# Patient Record
Sex: FEMALE | Race: WHITE | Employment: UNEMPLOYED | RURAL
[De-identification: names, ages, dates, MRNs, and addresses within clinical notes are randomized per-mention and may not be internally consistent; named-entity substitution may affect disease eponyms.]

---

## 2019-04-12 ENCOUNTER — APPOINTMENT (OUTPATIENT)
Dept: GENERAL RADIOLOGY | Facility: HOSPITAL | Age: 74
End: 2019-04-12
Payer: MEDICARE

## 2019-04-12 ENCOUNTER — HOSPITAL ENCOUNTER (EMERGENCY)
Facility: HOSPITAL | Age: 74
Discharge: HOME | End: 2019-04-12
Attending: HOSPITALIST
Payer: MEDICARE

## 2019-04-12 VITALS
WEIGHT: 95 LBS | BODY MASS INDEX: 17.48 KG/M2 | DIASTOLIC BLOOD PRESSURE: 84 MMHG | HEIGHT: 62 IN | OXYGEN SATURATION: 98 % | HEART RATE: 100 BPM | SYSTOLIC BLOOD PRESSURE: 141 MMHG | RESPIRATION RATE: 19 BRPM

## 2019-04-12 DIAGNOSIS — G30.1 LATE ONSET ALZHEIMER'S DISEASE WITH BEHAVIORAL DISTURBANCE (HCC): Primary | ICD-10-CM

## 2019-04-12 DIAGNOSIS — F02.818 LATE ONSET ALZHEIMER'S DISEASE WITH BEHAVIORAL DISTURBANCE (HCC): Primary | ICD-10-CM

## 2019-04-12 LAB
A/G RATIO: 1.1 (ref 0.8–2)
ALBUMIN SERPL-MCNC: 3.2 G/DL (ref 3.4–4.8)
ALP BLD-CCNC: 141 U/L (ref 25–100)
ALT SERPL-CCNC: 6 U/L (ref 4–36)
AMPHETAMINE SCREEN, URINE: ABNORMAL
ANION GAP SERPL CALCULATED.3IONS-SCNC: 12 MMOL/L (ref 3–16)
AST SERPL-CCNC: 13 U/L (ref 8–33)
BARBITURATE SCREEN URINE: ABNORMAL
BASOPHILS ABSOLUTE: 0.1 K/UL (ref 0–0.1)
BASOPHILS RELATIVE PERCENT: 0.7 %
BENZODIAZEPINE SCREEN, URINE: POSITIVE
BILIRUB SERPL-MCNC: 0.7 MG/DL (ref 0.3–1.2)
BILIRUBIN URINE: NEGATIVE
BLOOD, URINE: ABNORMAL
BUN BLDV-MCNC: 10 MG/DL (ref 6–20)
CALCIUM SERPL-MCNC: 8.7 MG/DL (ref 8.5–10.5)
CANNABINOID SCREEN URINE: ABNORMAL
CHLORIDE BLD-SCNC: 103 MMOL/L (ref 98–107)
CLARITY: CLEAR
CO2: 26 MMOL/L (ref 20–30)
COCAINE METABOLITE SCREEN URINE: ABNORMAL
COLOR: YELLOW
CREAT SERPL-MCNC: 1 MG/DL (ref 0.4–1.2)
CRYSTALS, UA: ABNORMAL /HPF
EOSINOPHILS ABSOLUTE: 0.1 K/UL (ref 0–0.4)
EOSINOPHILS RELATIVE PERCENT: 0.5 %
GFR AFRICAN AMERICAN: >59
GFR NON-AFRICAN AMERICAN: 54
GLOBULIN: 3 G/DL
GLUCOSE BLD-MCNC: 151 MG/DL (ref 74–106)
GLUCOSE URINE: NEGATIVE MG/DL
HCT VFR BLD CALC: 45.7 % (ref 37–47)
HEMOGLOBIN: 14.2 G/DL (ref 11.5–16.5)
IMMATURE GRANULOCYTES #: 0 K/UL
IMMATURE GRANULOCYTES %: 0.2 % (ref 0–5)
KETONES, URINE: ABNORMAL MG/DL
LEUKOCYTE ESTERASE, URINE: NEGATIVE
LYMPHOCYTES ABSOLUTE: 1.5 K/UL (ref 1.5–4)
LYMPHOCYTES RELATIVE PERCENT: 15.4 %
MAGNESIUM: 2.4 MG/DL (ref 1.7–2.4)
MCH RBC QN AUTO: 28.2 PG (ref 27–32)
MCHC RBC AUTO-ENTMCNC: 31.1 G/DL (ref 31–35)
MCV RBC AUTO: 90.7 FL (ref 80–100)
METHADONE SCREEN, URINE: ABNORMAL
METHAMPHETAMINE, URINE: ABNORMAL
MICROSCOPIC EXAMINATION: YES
MONOCYTES ABSOLUTE: 0.5 K/UL (ref 0.2–0.8)
MONOCYTES RELATIVE PERCENT: 4.8 %
MUCUS: ABNORMAL /LPF
NEUTROPHILS ABSOLUTE: 7.7 K/UL (ref 2–7.5)
NEUTROPHILS RELATIVE PERCENT: 78.4 %
NITRITE, URINE: NEGATIVE
OPIATE SCREEN URINE: ABNORMAL
PDW BLD-RTO: 15.2 % (ref 11–16)
PH UA: 6.5 (ref 5–8)
PHENCYCLIDINE SCREEN URINE: ABNORMAL
PLATELET # BLD: 338 K/UL (ref 150–400)
PMV BLD AUTO: 9.5 FL (ref 6–10)
POTASSIUM REFLEX MAGNESIUM: 3.5 MMOL/L (ref 3.4–5.1)
PRO-BNP: 242 PG/ML (ref 0–1800)
PROPOXYPHENE SCREEN, URINE: ABNORMAL
PROTEIN UA: ABNORMAL MG/DL
RBC # BLD: 5.04 M/UL (ref 3.8–5.8)
RBC UA: ABNORMAL /HPF (ref 0–2)
SODIUM BLD-SCNC: 141 MMOL/L (ref 136–145)
SPECIFIC GRAVITY UA: 1.02 (ref 1–1.03)
TOTAL PROTEIN: 6.2 G/DL (ref 6.4–8.3)
TRICYCLIC, URINE: ABNORMAL
TROPONIN: <0.3 NG/ML
TSH SERPL DL<=0.05 MIU/L-ACNC: 1.59 UIU/ML (ref 0.35–5.5)
UR OXYCODONE RAPID SCREEN: ABNORMAL
URINE REFLEX TO CULTURE: ABNORMAL
URINE TYPE: ABNORMAL
UROBILINOGEN, URINE: 0.2 E.U./DL
WBC # BLD: 9.9 K/UL (ref 4–11)
WBC UA: ABNORMAL /HPF (ref 0–5)

## 2019-04-12 PROCEDURE — 93005 ELECTROCARDIOGRAM TRACING: CPT

## 2019-04-12 PROCEDURE — 80307 DRUG TEST PRSMV CHEM ANLYZR: CPT

## 2019-04-12 PROCEDURE — 83735 ASSAY OF MAGNESIUM: CPT

## 2019-04-12 PROCEDURE — 84484 ASSAY OF TROPONIN QUANT: CPT

## 2019-04-12 PROCEDURE — 81001 URINALYSIS AUTO W/SCOPE: CPT

## 2019-04-12 PROCEDURE — 80053 COMPREHEN METABOLIC PANEL: CPT

## 2019-04-12 PROCEDURE — 83880 ASSAY OF NATRIURETIC PEPTIDE: CPT

## 2019-04-12 PROCEDURE — 99283 EMERGENCY DEPT VISIT LOW MDM: CPT

## 2019-04-12 PROCEDURE — 36415 COLL VENOUS BLD VENIPUNCTURE: CPT

## 2019-04-12 PROCEDURE — 71045 X-RAY EXAM CHEST 1 VIEW: CPT

## 2019-04-12 PROCEDURE — 85025 COMPLETE CBC W/AUTO DIFF WBC: CPT

## 2019-04-12 PROCEDURE — 84443 ASSAY THYROID STIM HORMONE: CPT

## 2019-04-12 RX ORDER — DONEPEZIL HYDROCHLORIDE 10 MG/1
10 TABLET, FILM COATED ORAL NIGHTLY
COMMUNITY

## 2019-04-12 RX ORDER — LANOLIN ALCOHOL/MO/W.PET/CERES
1000 CREAM (GRAM) TOPICAL DAILY
COMMUNITY

## 2019-04-12 RX ORDER — LORAZEPAM 1 MG/1
1 TABLET ORAL 2 TIMES DAILY PRN
Qty: 20 TABLET | Refills: 0 | Status: SHIPPED | OUTPATIENT
Start: 2019-04-12 | End: 2019-04-22

## 2019-04-12 RX ORDER — CHOLECALCIFEROL (VITAMIN D3) 125 MCG
50000 TABLET ORAL WEEKLY
COMMUNITY

## 2019-04-12 RX ORDER — LEVOTHYROXINE SODIUM 0.03 MG/1
25 TABLET ORAL DAILY
COMMUNITY

## 2019-04-12 RX ORDER — MEGESTROL ACETATE 40 MG/1
40 TABLET ORAL EVERY OTHER DAY
COMMUNITY

## 2019-04-12 NOTE — ED TRIAGE NOTES
Patient brought to ED by family members for complaints of feet swelling of 6 - 8 months, dehydration and alzheimers for years.

## 2019-04-12 NOTE — ED NOTES
Unable to obtain vital signs. Pt refuses to lay still and removes bp cuff and pulse ox.       Dee Orantes RN  04/12/19 2300

## 2019-04-12 NOTE — ED PROVIDER NOTES
[address]      Pt Name: Bryce Gutiérrez  MRN: [id number]  YOB: 1945  Date of evaluation: 4/12/2019  Provider: Norma Martinez, [facility]       Chief Complaint   Patient presents with    Leg Swelling         HISTORY OF PRESENT ILLNESS  (Location/Symptom, Timing/Onset, Context/Setting, Quality, Duration, Modifying Factors, Severity.)   Bryce Gutiérrez is a 75 y.o. female who presents to the emergency department for multiple complaints. The patient was diagnosed with Alzheimer's dementia proximally 5 years or longer. She is been on medication for this and her dementia has progressively worsened since that time. Patient's family states that she actually had to be sedated last night by home health with Geodon orally in attempt to get her to the hospital for evaluation of a the patient was unable to get the patient here. They brought her to the [address] [place] [address] this morning for evaluation. Since family states that she has not been eating or drinking much or last several days. She is seemed to have lost control of her bowels and was concerned about some mild swelling to her feet. Family also states that her urine seems to be darker than normal and does have a foul smell to it. Patient's family states that she is really not had any acute change in her mental status but her mental status has slowly worsened over the last several years. Family states is probably been about 6 months to years since she has seen her regular family physician who initially started her on this medication because they state they just cannot get her out of the house. Speaking to the patient she completely denies any complaints at all and states that she wants to go home. Per patient review of systems is negative. Nursing notes were reviewed.     REVIEW OFSYSTEMS    (2-9 systems for level 4, 10 or more for level 5)   ROS:  General:  No fevers, no chills, no weakness  Cardiovascular:  No chest pain, no palpitations  Respiratory:  No shortness of breath, no cough, no wheezing  Gastrointestinal:  No pain, no nausea, no vomiting, no diarrhea  Musculoskeletal:  No muscle pain, no joint pain  Skin:  No rash, no easy bruising  Neurologic:  No speech problems, no headache, no extremity weakness  Psychiatric:  No anxiety  Genitourinary:  No dysuria, no hematuria    Except as noted above the remainder of the review of systems was reviewed and negative. PAST MEDICAL HISTORY     Past Medical History:   Diagnosis Date    Thyroid disease          SURGICAL HISTORY     History reviewed. No pertinent surgical history. CURRENT MEDICATIONS       Previous Medications    DONEPEZIL (ARICEPT) 10 MG TABLET    Take 10 mg by mouth nightly    ERGOCALCIFEROL (VITAMIN D2) 2000 UNITS TABS    Take 50,000 Units by mouth once a week     LEVOTHYROXINE (SYNTHROID) 25 MCG TABLET    Take 25 mcg by mouth Daily    MEGESTROL (MEGACE) 40 MG TABLET    Take 40 mg by mouth every other day    VITAMIN B-12 (CYANOCOBALAMIN) 1000 MCG TABLET    Take 1,000 mcg by mouth daily       ALLERGIES     Patient has no known allergies. FAMILY HISTORY     History reviewed. No pertinent family history.        SOCIAL HISTORY       Social History     Socioeconomic History    Marital status:      Spouse name: None    Number of children: None    Years of education: None    Highest education level: None   Occupational History    None   Social Needs    Financial resource strain: None    Food insecurity:     Worry: None     Inability: None    Transportation needs:     Medical: None     Non-medical: None   Tobacco Use    Smoking status: Never Smoker    Smokeless tobacco: Never Used   Substance and Sexual Activity    Alcohol use: None    Drug use: None    Sexual activity: None   Lifestyle    Physical activity:     Days per week: None     Minutes per session: None    Stress: None   Relationships    Social connections:     Talks on phone: None     Gets together: None     Attends Jainism service: None     Active member of club or organization: None     Attends meetings of clubs or organizations: None     Relationship status: None    Intimate partner violence:     Fear of current or ex partner: None     Emotionally abused: None     Physically abused: None     Forced sexual activity: None   Other Topics Concern    None   Social History Narrative    None         PHYSICAL EXAM    (up to 7 for level 4, 8 or more for level 5)     ED Triage Vitals   BP Temp Temp src Pulse Resp SpO2 Height Weight   04/12/19 1003 -- -- 04/12/19 1005 04/12/19 1003 04/12/19 1005 04/12/19 1005 04/12/19 1005   (!) 141/84   100 20 98 % 5' 2\" (1.575 m) 95 lb (43.1 kg)       Physical Exam  General :Patient is awake, alert, oriented, in no acute distress, nontoxic appearing  HEENT: Pupils are equally round and reactive to light, EOMI, conjunctivae clear. Oral mucosa is moist, no exudate. Uvula is midline  Cardiac: Heart regular rate, rhythm, no murmurs, rubs, or gallops  Lungs: Lungs are clear to auscultation, there is no wheezing, rhonchi, or rales. There is no use of accessory muscles. Chest wall: There is no tenderness to palpation over the chest wall or over ribs  Abdomen: Abdomen is soft, nontender, nondistended. There is no firm or pulsatile masses, no rebound rigidity or guarding. Musculoskeletal: 5 out of 5 strength in all 4 extremities. No focal muscle deficits are appreciated  Neuro: Motor intact, sensory intact, level of consciousness is normal  Dermatology: Skin is warm and dry  Psych: patient is agitated. Flat affect      DIAGNOSTIC RESULTS     EKG: All EKG's are interpreted by the Emergency Department Physician who either signs or Co-signs this chart in the absenceof a cardiologist.    The EKG interpreted by me shows sinus rhythm.  Ventricular rate is 89 bpm, [place] interval is 154 ms, QRS duration is 102 ms, QT/QTC intervals 370/451 ms. No acute T-wave inversions concerning for acute micro-ischemia. No ST elevations concerning for acute myocardial infarction. RADIOLOGY:   Non-plain film images such as CT, Ultrasound and MRI are read by the radiologist. Plain radiographic images are visualized and preliminarily interpreted by the emergency physician with the below findings:      ? Radiologist's Report Reviewed:  XR CHEST PORTABLE   Final Result      1. No acute disease.                   ED BEDSIDE ULTRASOUND:   Performed by ED Physician - none    LABS:    I have reviewed and interpreted all of the currently available lab results from this visit (ifapplicable):  Results for orders placed or performed during the hospital encounter of 04/12/19   CBC Auto Differential   Result Value Ref Range    WBC 9.9 4.0 - 11.0 K/uL    RBC 5.04 3.80 - 5.80 M/uL    Hemoglobin 14.2 11.5 - 16.5 g/dL    Hematocrit 45.7 37.0 - 47.0 %    MCV 90.7 80.0 - 100.0 fL    MCH 28.2 27.0 - 32.0 pg    MCHC 31.1 31.0 - 35.0 g/dL    RDW 15.2 11.0 - 16.0 %    Platelets 681 877 - 317 K/uL    MPV 9.5 6.0 - 10.0 fL    Neutrophils % 78.4 %    Immature Granulocytes % 0.2 0.0 - 5.0 %    Lymphocytes % 15.4 %    Monocytes % 4.8 %    Eosinophils % 0.5 %    Basophils % 0.7 %    Neutrophils # 7.7 (H) 2.0 - 7.5 K/uL    Immature Granulocytes # 0.0 K/uL    Lymphocytes # 1.5 1.5 - 4.0 K/uL    Monocytes # 0.5 0.2 - 0.8 K/uL    Eosinophils # 0.1 0.0 - 0.4 K/uL    Basophils # 0.1 0.0 - 0.1 K/uL   Comprehensive Metabolic Panel w/ Reflex to MG   Result Value Ref Range    Sodium 141 136 - 145 mmol/L    Potassium reflex Magnesium 3.5 3.4 - 5.1 mmol/L    Chloride 103 98 - 107 mmol/L    CO2 26 20 - 30 mmol/L    Anion Gap 12 3 - 16    Glucose 151 (H) 74 - 106 mg/dL    BUN 10 6 - 20 mg/dL    CREATININE 1.0 0.4 - 1.2 mg/dL    GFR Non-African American 54 (L) >59    GFR African American >59 >59    Calcium 8.7 8.5 - 10.5 mg/dL    Total Protein 6.2 (L) 6.4 - 8.3 g/dL    Alb 3.2 (L) 3.4 - 4.8 g/dL Albumin/Globulin Ratio 1.1 0.8 - 2.0    Total Bilirubin 0.7 0.3 - 1.2 mg/dL    Alkaline Phosphatase 141 (H) 25 - 100 U/L    ALT 6 4 - 36 U/L    AST 13 8 - 33 U/L    Globulin 3.0 g/dL   Troponin   Result Value Ref Range    Troponin <0.30 <0.30 ng/mL   Brain Natriuretic Peptide   Result Value Ref Range    Pro- 0 - 1,800 pg/mL   TSH without Reflex   Result Value Ref Range    TSH 1.59 0.35 - 5.50 uIU/mL   Urine Reflex to Culture   Result Value Ref Range    Color, UA Yellow Straw/Yellow    Clarity, UA Clear Clear    Glucose, Ur Negative Negative mg/dL    Bilirubin Urine Negative Negative    Ketones, Urine TRACE (A) Negative mg/dL    Specific Gravity, UA 1.025 1.005 - 1.030    Blood, Urine TRACE-INTACT (A) Negative    pH, UA 6.5 5.0 - 8.0    Protein, UA TRACE (A) Negative mg/dL    Urobilinogen, Urine 0.2 <2.0 E.U./dL    Nitrite, Urine Negative Negative    Leukocyte Esterase, Urine Negative Negative    Microscopic Examination YES     Urine Reflex to Culture Not Indicated     Urine Type Not Specified    Drug Screen, Multiple, Urine   Result Value Ref Range    PCP Screen, Urine Neg Negative <25 ng/mL    Benzodiazepine Screen, Urine POSITIVE (A) Negative <300 ng/mL    Cocaine Metabolite Screen, Urine Neg Negative <300 ng/mL    Amphetamine Screen, Urine Neg Negative <1000 ng/mL    Cannabinoid Scrn, Ur Neg Negative <50 ng/mL    Opiate Scrn, Ur Neg Negative <300 ng/mL    Barbiturate Screen, Ur Neg Negative <351 ng/mL    Tricyclic Neg Negative <282 ng/mL    Methadone Screen, Urine Neg Negative <300 ng/ml    Propoxyphene Screen, Urine Neg Negative <300 ng/mL    Methamphetamine, Urine Neg Negative <1000 ng/mL    UR Oxycodone Rapid Screen Neg Negative <100 ng/mL   Microscopic Urinalysis   Result Value Ref Range    Mucus, UA 2+ (A) /LPF    WBC, UA 0-2 0 - 5 /HPF    RBC, UA 0-2 0 - 2 /HPF    Crystals Few Ca. Oxalate (A) /HPF        All other labs were within normal range or not returned as of this dictation.     EMERGENCY DEPARTMENT COURSE and DIFFERENTIAL DIAGNOSIS/MDM:   Vitals:    Vitals:    04/12/19 1003 04/12/19 1005   BP: (!) 141/84 (!) 141/84   Pulse:  100   Resp: 20 19   SpO2:  98%   Weight:  95 lb (43.1 kg)   Height:  5' 2\" (1.575 m)       MEDICATIONS ADMINISTERED IN ED:  Medications - No data to display    After initial evaluation examination I did have a conversation with the patient's family about the upcoming plan, treatment and possible disposition stable agreeable to the time of this dictation. Advised that since her dementia and change in mental status is been slowly progressive since her diagnoses with Alzheimer's I do not feel that a CT scan ahead is necessary at this time. Patient per family is acting appropriately to exam however the  states he is just used to it. I do not believe the patient wouldn't tolerate or allow us to perform a CT scan ahead without massive amount of sedation. I believe the risks of sedation would outweigh the benefit of the test itself. We will perform a portable chest radiograph. We'll also perform a CBC, CMP, troponin, BNP, TSH without reflex, UA and urine drug screen. Patient will also have twelve-lead EKG performed. She will most likely require catheterization for her urine sample. Patient's final disposition be determined once her radiological diagnostic studies been performed and reviewed. Blood work showed white count 9900, hemoglobin was 14.2, hematocrit is 45.7, platelet counts 505. Apprehensive metabolic panel was benign except for glucose 151 and alk phosphatase of 141. Troponin was nondetectable less than 0.30. ProBNP was 242. TSH was 1.59 which is normal.     UA performed and no culture indicated. Urine drug screen was positive for benzodiazepines    Portable chest radiograph read by radiology as no acute disease. Patient's radiological diagnostic studies were discussed with the family and they do state understanding.  Family advised that there is no laboratory abnormality or radiological abnormality that would justify the patient being admitted to the hospital. Even though she has had some slight worsening of her mental status this is chronic from her diagnosis of Alzheimer's dementia. Denies that would write him for a small amount of possible Ativan to give the patient she seems agitated but seems to be doing well on the Geodon that the home health nurse had provided her yesterday. Patient and family was advised they do need to follow-up with her regular family physician within the next 1-2 days for reevaluation. Family was given instructions of his symptoms worsens or new symptoms arise he should return back to emergency department for further evaluation workup. Attestation: The Prescription Monitoring Report for this patient was reviewed today. (Alex Dowell DO)    CONSULTS:  None    PROCEDURES:  Procedures    CRITICAL CARE TIME    Total Critical Care time was 0 minutes, excluding separately reportable procedures. There was a high probability of clinically significant/life threatening deterioration in the patient's condition which required my urgent intervention. FINAL IMPRESSION      1. Late onset Alzheimer's disease with behavioral disturbance          DISPOSITION/PLAN   DISPOSITION        PATIENT REFERRED TO:  No follow-up provider specified. DISCHARGE MEDICATIONS:  New Prescriptions    LORAZEPAM (ATIVAN) 1 MG TABLET    Take 1 tablet by mouth 2 times daily as needed for Anxiety for up to 10 days. Comment: Please note this report has been produced using speech recognition software and may contain errorsrelated to that system including errors in grammar, punctuation, and spelling, as well as words and phrases that may be inappropriate. If there are any questions or concerns please feel free to contact the dictating providerfor clarification.     Shanti Garcias DO  Attending Emergency Physician              Shanti Garcias DO  04/12/19 5009